# Patient Record
(demographics unavailable — no encounter records)

---

## 2024-12-17 NOTE — LETTER BODY
[Please see my note below.] : Please see my note below. [FreeTextEntry2] : Stephen Balderas MD [FreeTextEntry1] :   Dear Doctor,     I had the opportunity to see your patient, Mr. MARIBEL SALDAÑA in followup. I am enclosing my office note for your information.   I will keep you informed of any developments.   Feel free to contact me if you have any questions.   Sincerely,   Mango Proctor MD, FACS Professor of Urology [facility] [address]   Office Telephone [phone]   Fax [phone]

## 2024-12-17 NOTE — HISTORY OF PRESENT ILLNESS
[FreeTextEntry1] : 76 year old   x 41  2 children Shimon Little MD for evaluation of  LUTS recent PSA 5.21 MRI demonstrated  PIRADS 4 patient was not satisfied with communication   2.14.2024  5.13.2024 IPSS 5 MY 15 returns on Flomax 2 tab urgency and frequency improved  nocturia x 1  s/p perineal biopsy  8.12.2024 returns re abnormal MRI and biopsy  12.17.2024 returns after perineal fusion biopsy

## 2024-12-17 NOTE — ASSESSMENT
[FreeTextEntry1] : 76 year old  with mild LUTS on tamsulosin Found to have rising PSA  (5.21) underwent MRI demonstratin. PSAD 0.08 2. Prostate volume 62.9 3. PIRAD 4 lesion apically Discussed findings  Discussed PIRADS Discussed PSAD We discussed the risks and complications of prostate biopsy and discussed the procedure. We discussed that procedure is performed by placing a rectal probe and administering anesthesia locally. Biopsies are then taken with a needle. Multiple biopsies are taken,  Risks of the procedure include hematuria, hematospermia, blood per rectum. Risks include infection, sepsis and even death. We discussed perineal vs transrectal biopsy We discussed decreased risk of perineal biopsy vs transrectal but associated greater discomfort  We discussed options of: 1. local, 2. IV sedation in Operating room 3. po Valium Fleets enema should be taken. ASA and NSAID must be stopped 1 week prior to procedure.  DIscussed LUTS improved on tamsulosin Discussed increasing to  2 tabs daily Pt understands that some of the most common side effect  of this medication include dizziness, dry mouth, fatigue, lightheadedness, palpitations. Pt informed to stop the medication should any of these occur.  Discussed "retrograde ejaculation" failure of emission from medication. 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 perneal fusion biopsy 3. cardiology clearance 4 hold plavix prior to biopsy The MARIBEL SALDAÑA  expressed fully understanding of the information provided, the consequences and the management.  2. Collected Date/Time:                   2024 10:51 EST Received Date/Time:                    2024 00:55 EST Surgical Pathology Report - Auth (Verified) Specimen(s) Submitted 1  Left anterior apex 2  Left anterior base 3  Right anterior apex 4  Right anterior base 5  Midline apex 6  Midline base 7  Left posterior apex 8  Left posterior base 9  Right posterior apex 10  Right posterior base 11  Left lateral 12  Right lateral 13  Right PZ  Final Diagnosis 1. Prostate, left anterior apex, needle biopsy -Benign fibromuscular tissue. -Prostatic glands are not identified.  2. Prostate, left anterior base, needle biopsy -Benign fibromuscular tissue. -Prostatic glands are not identified.  3. Prostate, right anterior apex, needle biopsy -Benign prostate tissue.  4. Prostate, right anterior base, needle biopsy -Benign fibromuscular tissue. -Prostatic glands are not identified.  5. Prostate, midline apex, needle biopsy -Prostate tissue with a small focus of atypical glands. See note.  Note:  Although these findings are atypical and suspicious for adenocarcinoma, there is insufficient cytologic and/or architectural atypia to establish a definitive diagnosis.  Repeat biopsy is recommended.  See  J Urol.  2006 Mar;175(3 Pt 1):820-34 for biopsy protocol to increase the likelihood of detecting prostate cancer after an initial atypical biopsy. Stains for high molecular weight cytokeratin and p63 show patchy staining in the some of the glands and the immunostain for alpha-methylacyl-CoA racemase (P504S) is positive.  6. Prostate, midline base, needle biopsy -Benign prostate tissue.  7. Prostate, left posterior apex, needle biopsy -Benign prostate tissue.  8. Prostate, left posterior base, needle biopsy -Benign prostate tissue.  9. Prostate, right posterior apex, needle biopsy -High grade prostatic intraepithelial neoplasia (PIN) with adjacent small atypical glands.  10. Prostate, right posterior base, needle biopsy -Benign prostate tissue.  11. Prostate, left lateral, needle biopsy -Benign prostate tissue.  12. Prostate, right lateral, needle biopsy  -Benign prostate tissue.  13. Prostate, right PZ, needle biopsy -Benign prostate tissue.  Note: This case was reviewed, and diagnosis confirmed during a quality review at the urologic pathology conference on 24.  Slide(s) with built in immunohistochemical study control(s) and negative control associated with this case has/have been verified by the sign out pathologist. For slide(s) without built in controls positive control slides has/have been reviewed and approved by immunohistochemistry lab  These immunohistochemical/ in-situ hybridization tests have been developed and their performance characteristics determined by [facility], Department of Pathology, Division of Immunopathology, [phone] [place] [address].  It has not been cleared or approved by the U.S. Food and Drug Administration. The FDA has determined that such clearance or approval is not necessary. This test is used for clinical purposes.  The laboratory is certified under the CLIA-88 as qualified to perform high complexity clinical testing.  Verified by: Nelda Hawthorne M.D., Chief of Genitourinary Pathology (Electronic Signature) Reported on: 24 17:03 EST, [facility], [address], [place] [address] ______________________________________We had extensive discussion with this patient concerning his pathology report.  We discussed the biopsies demonstrating fibromuscular tissue.  These are not areas of concern we discussed the absence of prostatic glands.  We discussed the potential significance of this.  We discussed atypia noted in the right apex tissue.    #9. Prostate, right posterior apex, needle biopsy -High grade prostatic intraepithelial neoplasia (PIN) with adjacent small atypical glands .This is not the area of the findings but is proximate to it.  We discussed the fact that this may represent significant pathology.    We discussed options of continued observation with periodic PSAs and repeat MRI in 1 year if PSA is stable.  We discussed the potential of a delay in diagnosis.  However in light of the PSA PSA density and absence of findings in the targeted biopsy continued observation is reasonable.  He understands that this may result in delayed diagnosis  The MARIBEL SALDAÑA  expressed fully understanding of the information provided, the consequences and the management.  His urinary symptoms have markedly proved on Flomax 0.8 mg.  He is pleased.  He has less urgency frequency and nocturia.  He has resumed his Plavix.  Plan: 1.  PSA in 3 to 4 months 2.  Flomax 0.8 mg daily 3.  Follow-up MRI in 6 to 12 months assuming there is no change in his PSA during this.  Time did this call  2024 Returns on Flomax 0.8 mg daily.  Has had marked improvement in his urinary symptoms.  He is pleased with his current urinary symptoms.  We had extensive discussion regarding his PSA and previous MRI.  We discussed the findings on his previous biopsy finding of fibromuscular tissue without glands..  We discussed potential significance of this finding.  We also discussed the atypical cells withou sufficient for diagnosis of prostate cancer.  We discussed the options of following PSA, repeating the MRI or repeating the biopsy at this time.  We discussed continuing to : 1. monitor PSA 2. repeat MRI in one year of sooner if PSA rises 3. repeat biopsy in one year or sooner if PSA rises The MARIBEL SALDAÑA  expressed fully understanding of the information provided, the consequences and the management.   Plan: PSA today fu in 3 months renewal of flomax 0.8 mg   12.17.2024 returns after fusion biopsy   Final Diagnosis 1. Prostate, left anterior apex, needle biopsy -Benign prostate tissue.  2. Prostate, left anterior base, needle biopsy -Benign fibromuscular tissue. -Prostatic glands are not identified.  3. Prostate, right anterior apex, needle biopsy -Prostate tissue with a small focus of atypical glands. See note.  4. Prostate, right anterior base, needle biopsy -Benign fibromuscular tissue. -Prostatic glands are not identified.  5. Prostate, midline apex, needle biopsy -Benign prostate tissue.  6. Prostate, midline base, needle biopsy -Benign prostate tissue.  7. Prostate, left posterior apex, needle biopsy -Benign prostate tissue.  8. Prostate, left posterior base, needle biopsy -Benign prostate tissue.  9. Prostate, right posterior apex, needle biopsy -Benign prostate tissue.  10. Prostate, right posterior base, needle biopsy -Benign fibromuscular tissue. -Prostatic glands are not identified.  11. Prostate, left lateral, needle biopsy -Benign prostate tissue.  12. Prostate, right lateral, needle biopsy -Benign prostate tissue.  13. Prostate, right mid PZPM, needle biopsy -Prostate tissue with a small focus of atypical glands. See note.  Note:  Although these findings are atypical and suspicious for adenocarcinoma, there is insufficient cytologic and/or architectural atypia to establish a definitive diagnosis.  Repeat biopsy is recommended.  See J Urol. 2006 Mar;175(3 Pt 1):820-34 for biopsy protocol to increase the likelihood of detecting prostate cancer after an initial atypical biopsy.  Stains for high molecular weight cytokeratin and p63 show patchy staining in the some of the glands. The immunostain for alpha-methylacyl-CoA racemase (P504S) is focally and weakly positive. Verified by: Nelda Hawthorne M.D., Chief of Genitourinary Pathology (Electronic Signature) Reported on: 24 14:28 [facility], [facility], [address] [place] [address]  I met with the patient today.  I demonstrated the MRI images.  Demonstrated targeted biopsy through the area of concern.  We discussed at length the prostate results demonstrating atypical cells.  The volume of atypical cells is not sufficient for the diagnosis of cancer although concerning.  Discussed the fact that this is the same report on 2 different occasions of the same area.  At this point we plan to proceed with monitoring his PSA.  We discussed repeating his MRI in 1 year unless there is a significant change in his PSA. with PSA in 4 months.

## 2025-01-06 NOTE — HISTORY OF PRESENT ILLNESS
[Ambidextrous] : ambidextrous [FreeTextEntry1] : Patient returns for follow-up of recurrent left small trigger finger.  He received a trigger finger injection into the left small finger on June 24, 2024 which lasted about 5-6 months.

## 2025-01-06 NOTE — PHYSICAL EXAM
[de-identified] : Tender A1 pulley left small finger with triggering or locking on a positive lift maneuver.

## 2025-01-06 NOTE — PHYSICAL EXAM
[de-identified] : Tender A1 pulley left small finger with triggering or locking on a positive lift maneuver.

## 2025-01-06 NOTE — ASSESSMENT
[FreeTextEntry1] : My impression is that this patient has a recurrent left small finger stenosing tenosynovitis of the finger, more commonly known as a trigger digit.  I recommended that the patient undergo a trigger finger injection. The risks, benefits, and alternatives were discussed with the patient in detail. This included (but was not limited to) pain, infection, subcutaneous atrophy, skin depigmentation, elevated glucose, etc...  The patient agreed to undergo the steroid injection. Under informed consent and sterile conditions, 1/2 cc of 2% plain lidocaine  and 1/2 cc of Kenalog 10 was precisely injected into the patient's finger.   A sterile Band-Aid was placed.  It is my hope that this significantly alleviates the patient's symptoms.  He was quite stiff prior from splinting and a figure-of-eight.  I recommend he perform tendon gliding exercises to regain his flexibility and when and if symptoms recur promptly call to schedule trigger finger decompression I will wait until he is significant stiffness

## 2025-04-17 NOTE — ASSESSMENT
[FreeTextEntry1] : 76 year old  with mild LUTS on tamsulosin Found to have rising PSA (5.21) underwent MRI demonstratin. PSAD 0.08 2. Prostate volume 62.9 3. PIRAD 4 lesion apically Discussed findings Discussed PIRADS Discussed PSAD We discussed the risks and complications of prostate biopsy and discussed the procedure. We discussed that procedure is performed by placing a rectal probe and administering anesthesia locally. Biopsies are then taken with a needle. Multiple biopsies are taken, Risks of the procedure include hematuria, hematospermia, blood per rectum. Risks include infection, sepsis and even death. We discussed perineal vs transrectal biopsy We discussed decreased risk of perineal biopsy vs transrectal but associated greater discomfort We discussed options of: 1. local, 2. IV sedation in Operating room 3. po Valium Fleets enema should be taken. ASA and NSAID must be stopped 1 week prior to procedure.  DIscussed LUTS improved on tamsulosin Discussed increasing to 2 tabs daily Pt understands that some of the most common side effect of this medication include dizziness, dry mouth, fatigue, lightheadedness, palpitations. Pt informed to stop the medication should any of these occur. Discussed "retrograde ejaculation" failure of emission from medication. 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 perneal fusion biopsy 3. cardiology clearance 4 hold plavix prior to biopsy The MARIBEL SALDAÑA expressed fully understanding of the information provided, the consequences and the management.  2. Collected Date/Time: 2024 10:51 EST Received Date/Time: 2024 00:55 EST Surgical Pathology Report - Auth (Verified) Specimen(s) Submitted 1 Left anterior apex 2 Left anterior base 3 Right anterior apex 4 Right anterior base 5 Midline apex 6 Midline base 7 Left posterior apex 8 Left posterior base 9 Right posterior apex 10 Right posterior base 11 Left lateral 12 Right lateral 13 Right PZ  Final Diagnosis 1. Prostate, left anterior apex, needle biopsy -Benign fibromuscular tissue. -Prostatic glands are not identified.  2. Prostate, left anterior base, needle biopsy -Benign fibromuscular tissue. -Prostatic glands are not identified.  3. Prostate, right anterior apex, needle biopsy -Benign prostate tissue.  4. Prostate, right anterior base, needle biopsy -Benign fibromuscular tissue. -Prostatic glands are not identified.  5. Prostate, midline apex, needle biopsy -Prostate tissue with a small focus of atypical glands. See note.  Note: Although these findings are atypical and suspicious for adenocarcinoma, there is insufficient cytologic and/or architectural atypia to establish a definitive diagnosis. Repeat biopsy is recommended. See J Urol. 2006 Mar;175(3 Pt 1):820-34 for biopsy protocol to increase the likelihood of detecting prostate cancer after an initial atypical biopsy. Stains for high molecular weight cytokeratin and p63 show patchy staining in the some of the glands and the immunostain for alpha-methylacyl-CoA racemase (P504S) is positive.  6. Prostate, midline base, needle biopsy -Benign prostate tissue.  7. Prostate, left posterior apex, needle biopsy -Benign prostate tissue.  8. Prostate, left posterior base, needle biopsy -Benign prostate tissue.  9. Prostate, right posterior apex, needle biopsy -High grade prostatic intraepithelial neoplasia (PIN) with adjacent small atypical glands.  10. Prostate, right posterior base, needle biopsy -Benign prostate tissue.  11. Prostate, left lateral, needle biopsy -Benign prostate tissue.  12. Prostate, right lateral, needle biopsy  -Benign prostate tissue.  13. Prostate, right PZ, needle biopsy -Benign prostate tissue.  Note: This case was reviewed, and diagnosis confirmed during a quality review at the urologic pathology conference on 24.  Slide(s) with built in immunohistochemical study control(s) and negative control associated with this case has/have been verified by the sign out pathologist. For slide(s) without built in controls positive control slides has/have been reviewed and approved by immunohistochemistry lab  These immunohistochemical/ in-situ hybridization tests have been developed and their performance characteristics determined by [facility], Department of Pathology, Division of Immunopathology, [phone] [place] [address]. It has not been cleared or approved by the U.S. Food and Drug Administration. The FDA has determined that such clearance or approval is not necessary. This test is used for clinical purposes. The laboratory is certified under the CLIA-88 as qualified to perform high complexity clinical testing.  Verified by: Nelda Hawthorne M.D., Chief of Genitourinary Pathology (Electronic Signature) Reported on: 24 17:03 EST, [facility], [address], [place] [address] ______________________________________We had extensive discussion with this patient concerning his pathology report. We discussed the biopsies demonstrating fibromuscular tissue. These are not areas of concern we discussed the absence of prostatic glands. We discussed the potential significance of this.  We discussed atypia noted in the right apex tissue.  #9. Prostate, right posterior apex, needle biopsy -High grade prostatic intraepithelial neoplasia (PIN) with adjacent small atypical glands.This is not the area of the findings but is proximate to it. We discussed the fact that this may represent significant pathology.   We discussed options of continued observation with periodic PSAs and repeat MRI in 1 year if PSA is stable. We discussed the potential of a delay in diagnosis. However in light of the PSA PSA density and absence of findings in the targeted biopsy continued observation is reasonable. He understands that this may result in delayed diagnosis  The MARIBEL SALDAÑA expressed fully understanding of the information provided, the consequences and the management.  His urinary symptoms have markedly proved on Flomax 0.8 mg. He is pleased. He has less urgency frequency and nocturia. He has resumed his Plavix.  Plan: 1. PSA in 3 to 4 months 2. Flomax 0.8 mg daily 3. Follow-up MRI in 6 to 12 months assuming there is no change in his PSA during this. Time did this call  2024 Returns on Flomax 0.8 mg daily. Has had marked improvement in his urinary symptoms. He is pleased with his current urinary symptoms.  We had extensive discussion regarding his PSA and previous MRI. We discussed the findings on his previous biopsy finding of fibromuscular tissue without glands.. We discussed potential significance of this finding. We also discussed the atypical cells withou sufficient for diagnosis of prostate cancer. We discussed the options of following PSA, repeating the MRI or repeating the biopsy at this time. We discussed continuing to : 1. monitor PSA 2. repeat MRI in one year of sooner if PSA rises 3. repeat biopsy in one year or sooner if PSA rises The MARIBEL SALDAÑA expressed fully understanding of the information provided, the consequences and the management.  Plan: PSA today fu in 3 months renewal of flomax 0.8 mg   12.17.2024 returns after fusion biopsy   Final Diagnosis 1. Prostate, left anterior apex, needle biopsy -Benign prostate tissue.  2. Prostate, left anterior base, needle biopsy -Benign fibromuscular tissue. -Prostatic glands are not identified.  3. Prostate, right anterior apex, needle biopsy -Prostate tissue with a small focus of atypical glands. See note.  4. Prostate, right anterior base, needle biopsy -Benign fibromuscular tissue. -Prostatic glands are not identified.  5. Prostate, midline apex, needle biopsy -Benign prostate tissue.  6. Prostate, midline base, needle biopsy -Benign prostate tissue.  7. Prostate, left posterior apex, needle biopsy -Benign prostate tissue.  8. Prostate, left posterior base, needle biopsy -Benign prostate tissue.  9. Prostate, right posterior apex, needle biopsy -Benign prostate tissue.  10. Prostate, right posterior base, needle biopsy -Benign fibromuscular tissue. -Prostatic glands are not identified.  11. Prostate, left lateral, needle biopsy -Benign prostate tissue.  12. Prostate, right lateral, needle biopsy -Benign prostate tissue.  13. Prostate, right mid PZPM, needle biopsy -Prostate tissue with a small focus of atypical glands. See note.  Note: Although these findings are atypical and suspicious for adenocarcinoma, there is insufficient cytologic and/or architectural atypia to establish a definitive diagnosis. Repeat biopsy is recommended. See J Urol. 2006 Mar;175(3 Pt 1):820-34 for biopsy protocol to increase the likelihood of detecting prostate cancer after an initial atypical biopsy.  Stains for high molecular weight cytokeratin and p63 show patchy staining in the some of the glands. The immunostain for alpha-methylacyl-CoA racemase (P504S) is focally and weakly positive. Verified by: Nelda Hawthorne M.D., Chief of Genitourinary Pathology (Electronic Signature) Reported on: 24 14:28 [facility], [facility], [address] [place] [address]  I met with the patient today. I demonstrated the MRI images. Demonstrated targeted biopsy through the area of concern. We discussed at length the prostate results demonstrating atypical cells. The volume of atypical cells is not sufficient for the diagnosis of cancer although concerning. Discussed the fact that this is the same report on 2 different occasions of the same area. At this point we plan to proceed with monitoring his PSA. We discussed repeating his MRI in 1 year unless there is a significant change in his PSA. with PSA in 4 months.  ..       returns re PSA reviewed prior biopsy  reviewed atypical pathology reviewed biopsy in light MRI last PSA 3.57  LUTS IPSS 9 raquel 18 improved on flomx 2 tabs  sexual function satisfied no retrograde ejaculation  Plan PSA free PSA urinalysis  renewal Flomax   fu in 6 months

## 2025-04-17 NOTE — ASSESSMENT
[FreeTextEntry1] : 76 year old  with mild LUTS on tamsulosin Found to have rising PSA (5.21) underwent MRI demonstratin. PSAD 0.08 2. Prostate volume 62.9 3. PIRAD 4 lesion apically Discussed findings Discussed PIRADS Discussed PSAD We discussed the risks and complications of prostate biopsy and discussed the procedure. We discussed that procedure is performed by placing a rectal probe and administering anesthesia locally. Biopsies are then taken with a needle. Multiple biopsies are taken, Risks of the procedure include hematuria, hematospermia, blood per rectum. Risks include infection, sepsis and even death. We discussed perineal vs transrectal biopsy We discussed decreased risk of perineal biopsy vs transrectal but associated greater discomfort We discussed options of: 1. local, 2. IV sedation in Operating room 3. po Valium Fleets enema should be taken. ASA and NSAID must be stopped 1 week prior to procedure.  DIscussed LUTS improved on tamsulosin Discussed increasing to 2 tabs daily Pt understands that some of the most common side effect of this medication include dizziness, dry mouth, fatigue, lightheadedness, palpitations. Pt informed to stop the medication should any of these occur. Discussed "retrograde ejaculation" failure of emission from medication. 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 perneal fusion biopsy 3. cardiology clearance 4 hold plavix prior to biopsy The MARIBEL SALDAÑA expressed fully understanding of the information provided, the consequences and the management.  2. Collected Date/Time: 2024 10:51 EST Received Date/Time: 2024 00:55 EST Surgical Pathology Report - Auth (Verified) Specimen(s) Submitted 1 Left anterior apex 2 Left anterior base 3 Right anterior apex 4 Right anterior base 5 Midline apex 6 Midline base 7 Left posterior apex 8 Left posterior base 9 Right posterior apex 10 Right posterior base 11 Left lateral 12 Right lateral 13 Right PZ  Final Diagnosis 1. Prostate, left anterior apex, needle biopsy -Benign fibromuscular tissue. -Prostatic glands are not identified.  2. Prostate, left anterior base, needle biopsy -Benign fibromuscular tissue. -Prostatic glands are not identified.  3. Prostate, right anterior apex, needle biopsy -Benign prostate tissue.  4. Prostate, right anterior base, needle biopsy -Benign fibromuscular tissue. -Prostatic glands are not identified.  5. Prostate, midline apex, needle biopsy -Prostate tissue with a small focus of atypical glands. See note.  Note: Although these findings are atypical and suspicious for adenocarcinoma, there is insufficient cytologic and/or architectural atypia to establish a definitive diagnosis. Repeat biopsy is recommended. See J Urol. 2006 Mar;175(3 Pt 1):820-34 for biopsy protocol to increase the likelihood of detecting prostate cancer after an initial atypical biopsy. Stains for high molecular weight cytokeratin and p63 show patchy staining in the some of the glands and the immunostain for alpha-methylacyl-CoA racemase (P504S) is positive.  6. Prostate, midline base, needle biopsy -Benign prostate tissue.  7. Prostate, left posterior apex, needle biopsy -Benign prostate tissue.  8. Prostate, left posterior base, needle biopsy -Benign prostate tissue.  9. Prostate, right posterior apex, needle biopsy -High grade prostatic intraepithelial neoplasia (PIN) with adjacent small atypical glands.  10. Prostate, right posterior base, needle biopsy -Benign prostate tissue.  11. Prostate, left lateral, needle biopsy -Benign prostate tissue.  12. Prostate, right lateral, needle biopsy  -Benign prostate tissue.  13. Prostate, right PZ, needle biopsy -Benign prostate tissue.  Note: This case was reviewed, and diagnosis confirmed during a quality review at the urologic pathology conference on 24.  Slide(s) with built in immunohistochemical study control(s) and negative control associated with this case has/have been verified by the sign out pathologist. For slide(s) without built in controls positive control slides has/have been reviewed and approved by immunohistochemistry lab  These immunohistochemical/ in-situ hybridization tests have been developed and their performance characteristics determined by [facility], Department of Pathology, Division of Immunopathology, [phone] [place] [address]. It has not been cleared or approved by the U.S. Food and Drug Administration. The FDA has determined that such clearance or approval is not necessary. This test is used for clinical purposes. The laboratory is certified under the CLIA-88 as qualified to perform high complexity clinical testing.  Verified by: Nelda Hawthorne M.D., Chief of Genitourinary Pathology (Electronic Signature) Reported on: 24 17:03 EST, [facility], [address], [place] [address] ______________________________________We had extensive discussion with this patient concerning his pathology report. We discussed the biopsies demonstrating fibromuscular tissue. These are not areas of concern we discussed the absence of prostatic glands. We discussed the potential significance of this.  We discussed atypia noted in the right apex tissue.  #9. Prostate, right posterior apex, needle biopsy -High grade prostatic intraepithelial neoplasia (PIN) with adjacent small atypical glands.This is not the area of the findings but is proximate to it. We discussed the fact that this may represent significant pathology.   We discussed options of continued observation with periodic PSAs and repeat MRI in 1 year if PSA is stable. We discussed the potential of a delay in diagnosis. However in light of the PSA PSA density and absence of findings in the targeted biopsy continued observation is reasonable. He understands that this may result in delayed diagnosis  The MARIBEL SALDAÑA expressed fully understanding of the information provided, the consequences and the management.  His urinary symptoms have markedly proved on Flomax 0.8 mg. He is pleased. He has less urgency frequency and nocturia. He has resumed his Plavix.  Plan: 1. PSA in 3 to 4 months 2. Flomax 0.8 mg daily 3. Follow-up MRI in 6 to 12 months assuming there is no change in his PSA during this. Time did this call  2024 Returns on Flomax 0.8 mg daily. Has had marked improvement in his urinary symptoms. He is pleased with his current urinary symptoms.  We had extensive discussion regarding his PSA and previous MRI. We discussed the findings on his previous biopsy finding of fibromuscular tissue without glands.. We discussed potential significance of this finding. We also discussed the atypical cells withou sufficient for diagnosis of prostate cancer. We discussed the options of following PSA, repeating the MRI or repeating the biopsy at this time. We discussed continuing to : 1. monitor PSA 2. repeat MRI in one year of sooner if PSA rises 3. repeat biopsy in one year or sooner if PSA rises The MARIBEL SALDAÑA expressed fully understanding of the information provided, the consequences and the management.  Plan: PSA today fu in 3 months renewal of flomax 0.8 mg   12.17.2024 returns after fusion biopsy   Final Diagnosis 1. Prostate, left anterior apex, needle biopsy -Benign prostate tissue.  2. Prostate, left anterior base, needle biopsy -Benign fibromuscular tissue. -Prostatic glands are not identified.  3. Prostate, right anterior apex, needle biopsy -Prostate tissue with a small focus of atypical glands. See note.  4. Prostate, right anterior base, needle biopsy -Benign fibromuscular tissue. -Prostatic glands are not identified.  5. Prostate, midline apex, needle biopsy -Benign prostate tissue.  6. Prostate, midline base, needle biopsy -Benign prostate tissue.  7. Prostate, left posterior apex, needle biopsy -Benign prostate tissue.  8. Prostate, left posterior base, needle biopsy -Benign prostate tissue.  9. Prostate, right posterior apex, needle biopsy -Benign prostate tissue.  10. Prostate, right posterior base, needle biopsy -Benign fibromuscular tissue. -Prostatic glands are not identified.  11. Prostate, left lateral, needle biopsy -Benign prostate tissue.  12. Prostate, right lateral, needle biopsy -Benign prostate tissue.  13. Prostate, right mid PZPM, needle biopsy -Prostate tissue with a small focus of atypical glands. See note.  Note: Although these findings are atypical and suspicious for adenocarcinoma, there is insufficient cytologic and/or architectural atypia to establish a definitive diagnosis. Repeat biopsy is recommended. See J Urol. 2006 Mar;175(3 Pt 1):820-34 for biopsy protocol to increase the likelihood of detecting prostate cancer after an initial atypical biopsy.  Stains for high molecular weight cytokeratin and p63 show patchy staining in the some of the glands. The immunostain for alpha-methylacyl-CoA racemase (P504S) is focally and weakly positive. Verified by: Nelda Hawthorne M.D., Chief of Genitourinary Pathology (Electronic Signature) Reported on: 24 14:28 [facility], [facility], [address] [place] [address]  I met with the patient today. I demonstrated the MRI images. Demonstrated targeted biopsy through the area of concern. We discussed at length the prostate results demonstrating atypical cells. The volume of atypical cells is not sufficient for the diagnosis of cancer although concerning. Discussed the fact that this is the same report on 2 different occasions of the same area. At this point we plan to proceed with monitoring his PSA. We discussed repeating his MRI in 1 year unless there is a significant change in his PSA. with PSA in 4 months.  ..       returns re PSA reviewed prior biopsy  reviewed atypical pathology reviewed biopsy in light MRI last PSA 3.57  LUTS IPSS 9 raquel 18 improved on flomx 2 tabs  sexual function satisfied no retrograde ejaculation  Plan PSA free PSA urinalysis  renewal Flomax   fu in 6 months

## 2025-04-17 NOTE — ASSESSMENT
[FreeTextEntry1] : 76 year old  with mild LUTS on tamsulosin Found to have rising PSA (5.21) underwent MRI demonstratin. PSAD 0.08 2. Prostate volume 62.9 3. PIRAD 4 lesion apically Discussed findings Discussed PIRADS Discussed PSAD We discussed the risks and complications of prostate biopsy and discussed the procedure. We discussed that procedure is performed by placing a rectal probe and administering anesthesia locally. Biopsies are then taken with a needle. Multiple biopsies are taken, Risks of the procedure include hematuria, hematospermia, blood per rectum. Risks include infection, sepsis and even death. We discussed perineal vs transrectal biopsy We discussed decreased risk of perineal biopsy vs transrectal but associated greater discomfort We discussed options of: 1. local, 2. IV sedation in Operating room 3. po Valium Fleets enema should be taken. ASA and NSAID must be stopped 1 week prior to procedure.  DIscussed LUTS improved on tamsulosin Discussed increasing to 2 tabs daily Pt understands that some of the most common side effect of this medication include dizziness, dry mouth, fatigue, lightheadedness, palpitations. Pt informed to stop the medication should any of these occur. Discussed "retrograde ejaculation" failure of emission from medication. 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 perneal fusion biopsy 3. cardiology clearance 4 hold plavix prior to biopsy The MARIBEL SALDAÑA expressed fully understanding of the information provided, the consequences and the management.  2. Collected Date/Time: 2024 10:51 EST Received Date/Time: 2024 00:55 EST Surgical Pathology Report - Auth (Verified) Specimen(s) Submitted 1 Left anterior apex 2 Left anterior base 3 Right anterior apex 4 Right anterior base 5 Midline apex 6 Midline base 7 Left posterior apex 8 Left posterior base 9 Right posterior apex 10 Right posterior base 11 Left lateral 12 Right lateral 13 Right PZ  Final Diagnosis 1. Prostate, left anterior apex, needle biopsy -Benign fibromuscular tissue. -Prostatic glands are not identified.  2. Prostate, left anterior base, needle biopsy -Benign fibromuscular tissue. -Prostatic glands are not identified.  3. Prostate, right anterior apex, needle biopsy -Benign prostate tissue.  4. Prostate, right anterior base, needle biopsy -Benign fibromuscular tissue. -Prostatic glands are not identified.  5. Prostate, midline apex, needle biopsy -Prostate tissue with a small focus of atypical glands. See note.  Note: Although these findings are atypical and suspicious for adenocarcinoma, there is insufficient cytologic and/or architectural atypia to establish a definitive diagnosis. Repeat biopsy is recommended. See J Urol. 2006 Mar;175(3 Pt 1):820-34 for biopsy protocol to increase the likelihood of detecting prostate cancer after an initial atypical biopsy. Stains for high molecular weight cytokeratin and p63 show patchy staining in the some of the glands and the immunostain for alpha-methylacyl-CoA racemase (P504S) is positive.  6. Prostate, midline base, needle biopsy -Benign prostate tissue.  7. Prostate, left posterior apex, needle biopsy -Benign prostate tissue.  8. Prostate, left posterior base, needle biopsy -Benign prostate tissue.  9. Prostate, right posterior apex, needle biopsy -High grade prostatic intraepithelial neoplasia (PIN) with adjacent small atypical glands.  10. Prostate, right posterior base, needle biopsy -Benign prostate tissue.  11. Prostate, left lateral, needle biopsy -Benign prostate tissue.  12. Prostate, right lateral, needle biopsy  -Benign prostate tissue.  13. Prostate, right PZ, needle biopsy -Benign prostate tissue.  Note: This case was reviewed, and diagnosis confirmed during a quality review at the urologic pathology conference on 24.  Slide(s) with built in immunohistochemical study control(s) and negative control associated with this case has/have been verified by the sign out pathologist. For slide(s) without built in controls positive control slides has/have been reviewed and approved by immunohistochemistry lab  These immunohistochemical/ in-situ hybridization tests have been developed and their performance characteristics determined by [facility], Department of Pathology, Division of Immunopathology, [phone] [place] [address]. It has not been cleared or approved by the U.S. Food and Drug Administration. The FDA has determined that such clearance or approval is not necessary. This test is used for clinical purposes. The laboratory is certified under the CLIA-88 as qualified to perform high complexity clinical testing.  Verified by: Nelda Hawthorne M.D., Chief of Genitourinary Pathology (Electronic Signature) Reported on: 24 17:03 EST, [facility], [address], [place] [address] ______________________________________We had extensive discussion with this patient concerning his pathology report. We discussed the biopsies demonstrating fibromuscular tissue. These are not areas of concern we discussed the absence of prostatic glands. We discussed the potential significance of this.  We discussed atypia noted in the right apex tissue.  #9. Prostate, right posterior apex, needle biopsy -High grade prostatic intraepithelial neoplasia (PIN) with adjacent small atypical glands.This is not the area of the findings but is proximate to it. We discussed the fact that this may represent significant pathology.   We discussed options of continued observation with periodic PSAs and repeat MRI in 1 year if PSA is stable. We discussed the potential of a delay in diagnosis. However in light of the PSA PSA density and absence of findings in the targeted biopsy continued observation is reasonable. He understands that this may result in delayed diagnosis  The MARIBEL SALDAÑA expressed fully understanding of the information provided, the consequences and the management.  His urinary symptoms have markedly proved on Flomax 0.8 mg. He is pleased. He has less urgency frequency and nocturia. He has resumed his Plavix.  Plan: 1. PSA in 3 to 4 months 2. Flomax 0.8 mg daily 3. Follow-up MRI in 6 to 12 months assuming there is no change in his PSA during this. Time did this call  2024 Returns on Flomax 0.8 mg daily. Has had marked improvement in his urinary symptoms. He is pleased with his current urinary symptoms.  We had extensive discussion regarding his PSA and previous MRI. We discussed the findings on his previous biopsy finding of fibromuscular tissue without glands.. We discussed potential significance of this finding. We also discussed the atypical cells withou sufficient for diagnosis of prostate cancer. We discussed the options of following PSA, repeating the MRI or repeating the biopsy at this time. We discussed continuing to : 1. monitor PSA 2. repeat MRI in one year of sooner if PSA rises 3. repeat biopsy in one year or sooner if PSA rises The MARIBEL SALDAÑA expressed fully understanding of the information provided, the consequences and the management.  Plan: PSA today fu in 3 months renewal of flomax 0.8 mg   12.17.2024 returns after fusion biopsy   Final Diagnosis 1. Prostate, left anterior apex, needle biopsy -Benign prostate tissue.  2. Prostate, left anterior base, needle biopsy -Benign fibromuscular tissue. -Prostatic glands are not identified.  3. Prostate, right anterior apex, needle biopsy -Prostate tissue with a small focus of atypical glands. See note.  4. Prostate, right anterior base, needle biopsy -Benign fibromuscular tissue. -Prostatic glands are not identified.  5. Prostate, midline apex, needle biopsy -Benign prostate tissue.  6. Prostate, midline base, needle biopsy -Benign prostate tissue.  7. Prostate, left posterior apex, needle biopsy -Benign prostate tissue.  8. Prostate, left posterior base, needle biopsy -Benign prostate tissue.  9. Prostate, right posterior apex, needle biopsy -Benign prostate tissue.  10. Prostate, right posterior base, needle biopsy -Benign fibromuscular tissue. -Prostatic glands are not identified.  11. Prostate, left lateral, needle biopsy -Benign prostate tissue.  12. Prostate, right lateral, needle biopsy -Benign prostate tissue.  13. Prostate, right mid PZPM, needle biopsy -Prostate tissue with a small focus of atypical glands. See note.  Note: Although these findings are atypical and suspicious for adenocarcinoma, there is insufficient cytologic and/or architectural atypia to establish a definitive diagnosis. Repeat biopsy is recommended. See J Urol. 2006 Mar;175(3 Pt 1):820-34 for biopsy protocol to increase the likelihood of detecting prostate cancer after an initial atypical biopsy.  Stains for high molecular weight cytokeratin and p63 show patchy staining in the some of the glands. The immunostain for alpha-methylacyl-CoA racemase (P504S) is focally and weakly positive. Verified by: Nelda Hawthorne M.D., Chief of Genitourinary Pathology (Electronic Signature) Reported on: 24 14:28 [facility], [facility], [address] [place] [address]  I met with the patient today. I demonstrated the MRI images. Demonstrated targeted biopsy through the area of concern. We discussed at length the prostate results demonstrating atypical cells. The volume of atypical cells is not sufficient for the diagnosis of cancer although concerning. Discussed the fact that this is the same report on 2 different occasions of the same area. At this point we plan to proceed with monitoring his PSA. We discussed repeating his MRI in 1 year unless there is a significant change in his PSA. with PSA in 4 months.  ..       returns re PSA reviewed prior biopsy  reviewed atypical pathology reviewed biopsy in light MRI last PSA 3.57  LUTS IPSS 9 raquel 18 improved on flomx 2 tabs  sexual function satisfied no retrograde ejaculation  Plan PSA free PSA urinalysis  renewal Flomax   fu in 6 months

## 2025-04-17 NOTE — PHYSICAL EXAM
[General Appearance - Well Developed] : well developed [General Appearance - Well Nourished] : well nourished [Normal Appearance] : normal appearance [Well Groomed] : well groomed [] : no respiratory distress [Exaggerated Use Of Accessory Muscles For Inspiration] : no accessory muscle use [Oriented To Time, Place, And Person] : oriented to person, place, and time [Affect] : the affect was normal

## 2025-04-17 NOTE — HISTORY OF PRESENT ILLNESS
[FreeTextEntry1] : 76 year old   x 41 2 children Shimon Little MD for evaluation of LUTS recent PSA 5.21 MRI demonstrated PIRADS 4 patient was not satisfied with communication   2.14.2024  5.13.2024 IPSS 5 MY 15 returns on Flomax 2 tab urgency and frequency improved nocturia x 1  s/p perineal biopsy  8.12.2024 returns re abnormal MRI and biopsy  12.17.2024 returns after perineal fusion biopsy  4.17.2025 returns re elevated PSA  s/p perineal fusion biopsy

## 2025-04-17 NOTE — ASSESSMENT
[FreeTextEntry1] : 76 year old  with mild LUTS on tamsulosin Found to have rising PSA (5.21) underwent MRI demonstratin. PSAD 0.08 2. Prostate volume 62.9 3. PIRAD 4 lesion apically Discussed findings Discussed PIRADS Discussed PSAD We discussed the risks and complications of prostate biopsy and discussed the procedure. We discussed that procedure is performed by placing a rectal probe and administering anesthesia locally. Biopsies are then taken with a needle. Multiple biopsies are taken, Risks of the procedure include hematuria, hematospermia, blood per rectum. Risks include infection, sepsis and even death. We discussed perineal vs transrectal biopsy We discussed decreased risk of perineal biopsy vs transrectal but associated greater discomfort We discussed options of: 1. local, 2. IV sedation in Operating room 3. po Valium Fleets enema should be taken. ASA and NSAID must be stopped 1 week prior to procedure.  DIscussed LUTS improved on tamsulosin Discussed increasing to 2 tabs daily Pt understands that some of the most common side effect of this medication include dizziness, dry mouth, fatigue, lightheadedness, palpitations. Pt informed to stop the medication should any of these occur. Discussed "retrograde ejaculation" failure of emission from medication. 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 perneal fusion biopsy 3. cardiology clearance 4 hold plavix prior to biopsy The MARIBEL SALDAÑA expressed fully understanding of the information provided, the consequences and the management.  2. Collected Date/Time: 2024 10:51 EST Received Date/Time: 2024 00:55 EST Surgical Pathology Report - Auth (Verified) Specimen(s) Submitted 1 Left anterior apex 2 Left anterior base 3 Right anterior apex 4 Right anterior base 5 Midline apex 6 Midline base 7 Left posterior apex 8 Left posterior base 9 Right posterior apex 10 Right posterior base 11 Left lateral 12 Right lateral 13 Right PZ  Final Diagnosis 1. Prostate, left anterior apex, needle biopsy -Benign fibromuscular tissue. -Prostatic glands are not identified.  2. Prostate, left anterior base, needle biopsy -Benign fibromuscular tissue. -Prostatic glands are not identified.  3. Prostate, right anterior apex, needle biopsy -Benign prostate tissue.  4. Prostate, right anterior base, needle biopsy -Benign fibromuscular tissue. -Prostatic glands are not identified.  5. Prostate, midline apex, needle biopsy -Prostate tissue with a small focus of atypical glands. See note.  Note: Although these findings are atypical and suspicious for adenocarcinoma, there is insufficient cytologic and/or architectural atypia to establish a definitive diagnosis. Repeat biopsy is recommended. See J Urol. 2006 Mar;175(3 Pt 1):820-34 for biopsy protocol to increase the likelihood of detecting prostate cancer after an initial atypical biopsy. Stains for high molecular weight cytokeratin and p63 show patchy staining in the some of the glands and the immunostain for alpha-methylacyl-CoA racemase (P504S) is positive.  6. Prostate, midline base, needle biopsy -Benign prostate tissue.  7. Prostate, left posterior apex, needle biopsy -Benign prostate tissue.  8. Prostate, left posterior base, needle biopsy -Benign prostate tissue.  9. Prostate, right posterior apex, needle biopsy -High grade prostatic intraepithelial neoplasia (PIN) with adjacent small atypical glands.  10. Prostate, right posterior base, needle biopsy -Benign prostate tissue.  11. Prostate, left lateral, needle biopsy -Benign prostate tissue.  12. Prostate, right lateral, needle biopsy  -Benign prostate tissue.  13. Prostate, right PZ, needle biopsy -Benign prostate tissue.  Note: This case was reviewed, and diagnosis confirmed during a quality review at the urologic pathology conference on 24.  Slide(s) with built in immunohistochemical study control(s) and negative control associated with this case has/have been verified by the sign out pathologist. For slide(s) without built in controls positive control slides has/have been reviewed and approved by immunohistochemistry lab  These immunohistochemical/ in-situ hybridization tests have been developed and their performance characteristics determined by [facility], Department of Pathology, Division of Immunopathology, [phone] [place] [address]. It has not been cleared or approved by the U.S. Food and Drug Administration. The FDA has determined that such clearance or approval is not necessary. This test is used for clinical purposes. The laboratory is certified under the CLIA-88 as qualified to perform high complexity clinical testing.  Verified by: Nelda Hawthorne M.D., Chief of Genitourinary Pathology (Electronic Signature) Reported on: 24 17:03 EST, [facility], [address], [place] [address] ______________________________________We had extensive discussion with this patient concerning his pathology report. We discussed the biopsies demonstrating fibromuscular tissue. These are not areas of concern we discussed the absence of prostatic glands. We discussed the potential significance of this.  We discussed atypia noted in the right apex tissue.  #9. Prostate, right posterior apex, needle biopsy -High grade prostatic intraepithelial neoplasia (PIN) with adjacent small atypical glands.This is not the area of the findings but is proximate to it. We discussed the fact that this may represent significant pathology.   We discussed options of continued observation with periodic PSAs and repeat MRI in 1 year if PSA is stable. We discussed the potential of a delay in diagnosis. However in light of the PSA PSA density and absence of findings in the targeted biopsy continued observation is reasonable. He understands that this may result in delayed diagnosis  The MARIBEL SALDAÑA expressed fully understanding of the information provided, the consequences and the management.  His urinary symptoms have markedly proved on Flomax 0.8 mg. He is pleased. He has less urgency frequency and nocturia. He has resumed his Plavix.  Plan: 1. PSA in 3 to 4 months 2. Flomax 0.8 mg daily 3. Follow-up MRI in 6 to 12 months assuming there is no change in his PSA during this. Time did this call  2024 Returns on Flomax 0.8 mg daily. Has had marked improvement in his urinary symptoms. He is pleased with his current urinary symptoms.  We had extensive discussion regarding his PSA and previous MRI. We discussed the findings on his previous biopsy finding of fibromuscular tissue without glands.. We discussed potential significance of this finding. We also discussed the atypical cells withou sufficient for diagnosis of prostate cancer. We discussed the options of following PSA, repeating the MRI or repeating the biopsy at this time. We discussed continuing to : 1. monitor PSA 2. repeat MRI in one year of sooner if PSA rises 3. repeat biopsy in one year or sooner if PSA rises The MARIBEL SALDAÑA expressed fully understanding of the information provided, the consequences and the management.  Plan: PSA today fu in 3 months renewal of flomax 0.8 mg   12.17.2024 returns after fusion biopsy   Final Diagnosis 1. Prostate, left anterior apex, needle biopsy -Benign prostate tissue.  2. Prostate, left anterior base, needle biopsy -Benign fibromuscular tissue. -Prostatic glands are not identified.  3. Prostate, right anterior apex, needle biopsy -Prostate tissue with a small focus of atypical glands. See note.  4. Prostate, right anterior base, needle biopsy -Benign fibromuscular tissue. -Prostatic glands are not identified.  5. Prostate, midline apex, needle biopsy -Benign prostate tissue.  6. Prostate, midline base, needle biopsy -Benign prostate tissue.  7. Prostate, left posterior apex, needle biopsy -Benign prostate tissue.  8. Prostate, left posterior base, needle biopsy -Benign prostate tissue.  9. Prostate, right posterior apex, needle biopsy -Benign prostate tissue.  10. Prostate, right posterior base, needle biopsy -Benign fibromuscular tissue. -Prostatic glands are not identified.  11. Prostate, left lateral, needle biopsy -Benign prostate tissue.  12. Prostate, right lateral, needle biopsy -Benign prostate tissue.  13. Prostate, right mid PZPM, needle biopsy -Prostate tissue with a small focus of atypical glands. See note.  Note: Although these findings are atypical and suspicious for adenocarcinoma, there is insufficient cytologic and/or architectural atypia to establish a definitive diagnosis. Repeat biopsy is recommended. See J Urol. 2006 Mar;175(3 Pt 1):820-34 for biopsy protocol to increase the likelihood of detecting prostate cancer after an initial atypical biopsy.  Stains for high molecular weight cytokeratin and p63 show patchy staining in the some of the glands. The immunostain for alpha-methylacyl-CoA racemase (P504S) is focally and weakly positive. Verified by: Nelda Hawthorne M.D., Chief of Genitourinary Pathology (Electronic Signature) Reported on: 24 14:28 [facility], [facility], [address] [place] [address]  I met with the patient today. I demonstrated the MRI images. Demonstrated targeted biopsy through the area of concern. We discussed at length the prostate results demonstrating atypical cells. The volume of atypical cells is not sufficient for the diagnosis of cancer although concerning. Discussed the fact that this is the same report on 2 different occasions of the same area. At this point we plan to proceed with monitoring his PSA. We discussed repeating his MRI in 1 year unless there is a significant change in his PSA. with PSA in 4 months.  ..       returns re PSA reviewed prior biopsy  reviewed atypical pathology reviewed biopsy in light MRI last PSA 3.57  LUTS IPSS 9 raquel 18 improved on flomx 2 tabs  sexual function satisfied no retrograde ejaculation  Plan PSA free PSA urinalysis  renewal Flomax   fu in 6 months

## 2025-07-14 NOTE — HISTORY OF PRESENT ILLNESS
[de-identified] : DOS: 5/22/25. [de-identified] :  7 weeks 4 days s/p Left little finger flexor mechanism decompression. OT for 4 sessions.  Patient has noticed right little finger clicking sensation for the past few months. No injections for this finger. No new trauma.   [de-identified] : Has a slight early MP contracture left small finger but no triggering or locking.  On the right has a positive lift maneuver with triggering [de-identified] :  7 weeks 4 days s/p Left little finger flexor mechanism decompression. [de-identified] : Recommend an MP extension splint at night on the left to minimize any residual contracture.  Has a new onset right small finger trigger finger  My impression is that this patient has right little finger stenosing tenosynovitis of the finger, more commonly known as a trigger digit.  I recommended that the patient undergo a trigger finger injection. The risks, benefits, and alternatives were discussed with the patient in detail. This included (but was not limited to) pain, infection, subcutaneous atrophy, skin depigmentation, elevated glucose, etc...  The patient agreed to undergo the steroid injection. Under informed consent and sterile conditions, 1/2 cc of 2% plain lidocaine  and 1/2 cc of Kenalog 10 was precisely injected into the patient's finger.   A sterile Band-Aid was placed.  It is my hope that this significantly alleviates the patient's symptoms.

## 2025-07-14 NOTE — HISTORY OF PRESENT ILLNESS
[de-identified] : DOS: 5/22/25. [de-identified] :  7 weeks 4 days s/p Left little finger flexor mechanism decompression. OT for 4 sessions.  Patient has noticed right little finger clicking sensation for the past few months. No injections for this finger. No new trauma.   [de-identified] : Has a slight early MP contracture left small finger but no triggering or locking.  On the right has a positive lift maneuver with triggering [de-identified] :  7 weeks 4 days s/p Left little finger flexor mechanism decompression. [de-identified] : Recommend an MP extension splint at night on the left to minimize any residual contracture.  Has a new onset right small finger trigger finger  My impression is that this patient has right little finger stenosing tenosynovitis of the finger, more commonly known as a trigger digit.  I recommended that the patient undergo a trigger finger injection. The risks, benefits, and alternatives were discussed with the patient in detail. This included (but was not limited to) pain, infection, subcutaneous atrophy, skin depigmentation, elevated glucose, etc...  The patient agreed to undergo the steroid injection. Under informed consent and sterile conditions, 1/2 cc of 2% plain lidocaine  and 1/2 cc of Kenalog 10 was precisely injected into the patient's finger.   A sterile Band-Aid was placed.  It is my hope that this significantly alleviates the patient's symptoms.